# Patient Record
Sex: FEMALE | Race: WHITE | NOT HISPANIC OR LATINO | Employment: UNEMPLOYED
[De-identification: names, ages, dates, MRNs, and addresses within clinical notes are randomized per-mention and may not be internally consistent; named-entity substitution may affect disease eponyms.]

---

## 2017-07-17 PROCEDURE — 88175 CYTOPATH C/V AUTO FLUID REDO: CPT | Performed by: OBSTETRICS & GYNECOLOGY

## 2017-07-17 PROCEDURE — 87624 HPV HI-RISK TYP POOLED RSLT: CPT | Performed by: OBSTETRICS & GYNECOLOGY

## 2018-02-20 PROBLEM — Z28.21 REFUSED INFLUENZA VACCINE: Status: ACTIVE | Noted: 2018-02-20

## 2018-02-20 PROBLEM — Z13.1 SCREENING FOR DIABETES MELLITUS: Status: ACTIVE | Noted: 2018-02-20

## 2018-02-20 PROBLEM — Z13.220 SCREENING CHOLESTEROL LEVEL: Status: ACTIVE | Noted: 2018-02-20

## 2018-02-20 PROBLEM — R11.0 NAUSEA: Status: ACTIVE | Noted: 2018-02-20

## 2018-02-20 PROBLEM — Z12.12 SCREENING FOR COLORECTAL CANCER: Status: ACTIVE | Noted: 2018-02-20

## 2018-02-20 PROBLEM — G43.009 MIGRAINE WITHOUT AURA AND WITHOUT STATUS MIGRAINOSUS, NOT INTRACTABLE: Status: ACTIVE | Noted: 2018-02-20

## 2018-02-20 PROBLEM — Z12.11 SCREENING FOR COLORECTAL CANCER: Status: ACTIVE | Noted: 2018-02-20

## 2018-02-20 PROBLEM — Z00.00 PREVENTATIVE HEALTH CARE: Status: ACTIVE | Noted: 2018-02-20

## 2018-02-20 PROCEDURE — 81003 URINALYSIS AUTO W/O SCOPE: CPT | Performed by: INTERNAL MEDICINE

## 2018-09-04 PROBLEM — Z12.11 SCREENING FOR COLORECTAL CANCER: Status: RESOLVED | Noted: 2018-02-20 | Resolved: 2018-09-04

## 2018-09-04 PROBLEM — F45.8 GRINDING TEETH: Status: ACTIVE | Noted: 2018-09-04

## 2018-09-04 PROBLEM — J30.89 SEASONAL ALLERGIC RHINITIS DUE TO OTHER ALLERGIC TRIGGER: Status: ACTIVE | Noted: 2018-09-04

## 2018-09-04 PROBLEM — Z12.12 SCREENING FOR COLORECTAL CANCER: Status: RESOLVED | Noted: 2018-02-20 | Resolved: 2018-09-04

## 2018-09-04 PROBLEM — Z13.1 SCREENING FOR DIABETES MELLITUS: Status: RESOLVED | Noted: 2018-02-20 | Resolved: 2018-09-04

## 2018-09-04 PROBLEM — R06.7 SNEEZING: Status: ACTIVE | Noted: 2018-09-04

## 2018-09-04 PROBLEM — Z13.220 SCREENING CHOLESTEROL LEVEL: Status: RESOLVED | Noted: 2018-02-20 | Resolved: 2018-09-04

## 2018-09-04 PROBLEM — Z28.21 REFUSED INFLUENZA VACCINE: Status: RESOLVED | Noted: 2018-02-20 | Resolved: 2018-09-04

## 2018-09-04 PROBLEM — R09.81 NASAL CONGESTION: Status: ACTIVE | Noted: 2018-09-04

## 2018-09-04 PROBLEM — G43.109 MIGRAINE WITH AURA AND WITHOUT STATUS MIGRAINOSUS, NOT INTRACTABLE: Status: ACTIVE | Noted: 2018-09-04

## 2018-09-04 PROBLEM — G43.009 MIGRAINE WITHOUT AURA AND WITHOUT STATUS MIGRAINOSUS, NOT INTRACTABLE: Status: RESOLVED | Noted: 2018-02-20 | Resolved: 2018-09-04

## 2018-09-04 PROBLEM — G43.009 MIGRAINE WITHOUT AURA AND WITHOUT STATUS MIGRAINOSUS, NOT INTRACTABLE: Status: ACTIVE | Noted: 2018-09-04

## 2018-10-18 PROBLEM — Z28.21 REFUSED INFLUENZA VACCINE: Status: ACTIVE | Noted: 2018-10-18

## 2018-10-18 PROBLEM — G44.209 TENSION HEADACHE: Status: ACTIVE | Noted: 2018-10-18

## 2018-10-18 PROBLEM — E66.3 OVERWEIGHT (BMI 25.0-29.9): Status: ACTIVE | Noted: 2018-10-18

## 2018-10-18 PROBLEM — Z13.29 SCREENING FOR THYROID DISORDER: Status: ACTIVE | Noted: 2018-10-18

## 2018-10-22 PROBLEM — Z12.12 SCREENING FOR COLORECTAL CANCER: Status: ACTIVE | Noted: 2018-10-22

## 2018-10-22 PROBLEM — Z12.11 SCREENING FOR COLORECTAL CANCER: Status: ACTIVE | Noted: 2018-10-22

## 2018-10-22 PROBLEM — Z12.11 SCREENING FOR COLON CANCER: Status: ACTIVE | Noted: 2018-10-22

## 2018-11-01 PROBLEM — R11.0 NAUSEA: Status: RESOLVED | Noted: 2018-02-20 | Resolved: 2018-11-01

## 2018-11-01 PROBLEM — Z00.00 PREVENTATIVE HEALTH CARE: Status: RESOLVED | Noted: 2018-02-20 | Resolved: 2018-11-01

## 2018-11-01 PROBLEM — R06.7 SNEEZING: Status: RESOLVED | Noted: 2018-09-04 | Resolved: 2018-11-01

## 2018-11-01 PROBLEM — R09.81 NASAL CONGESTION: Status: RESOLVED | Noted: 2018-09-04 | Resolved: 2018-11-01

## 2018-11-21 PROBLEM — Z12.12 SCREENING FOR COLORECTAL CANCER: Status: RESOLVED | Noted: 2018-10-22 | Resolved: 2018-11-21

## 2018-11-21 PROBLEM — M54.50 CHRONIC BILATERAL LOW BACK PAIN WITHOUT SCIATICA: Status: ACTIVE | Noted: 2018-11-21

## 2018-11-21 PROBLEM — G43.009 MIGRAINE WITHOUT AURA AND WITHOUT STATUS MIGRAINOSUS, NOT INTRACTABLE: Status: RESOLVED | Noted: 2018-09-04 | Resolved: 2018-11-21

## 2018-11-21 PROBLEM — Z13.29 SCREENING FOR THYROID DISORDER: Status: RESOLVED | Noted: 2018-10-18 | Resolved: 2018-11-21

## 2018-11-21 PROBLEM — G44.209 TENSION HEADACHE: Status: RESOLVED | Noted: 2018-10-18 | Resolved: 2018-11-21

## 2018-11-21 PROBLEM — G89.29 CHRONIC BILATERAL LOW BACK PAIN WITHOUT SCIATICA: Status: ACTIVE | Noted: 2018-11-21

## 2018-11-21 PROBLEM — M62.830 BACK MUSCLE SPASM: Status: ACTIVE | Noted: 2018-11-21

## 2018-11-21 PROBLEM — G43.109 MIGRAINE WITH AURA AND WITHOUT STATUS MIGRAINOSUS, NOT INTRACTABLE: Status: RESOLVED | Noted: 2018-09-04 | Resolved: 2018-11-21

## 2018-11-21 PROBLEM — Z98.1 S/P LUMBAR SPINAL FUSION: Status: ACTIVE | Noted: 2018-11-21

## 2018-11-21 PROBLEM — Z28.21 REFUSED INFLUENZA VACCINE: Status: RESOLVED | Noted: 2018-10-18 | Resolved: 2018-11-21

## 2018-11-21 PROBLEM — Z12.11 SCREENING FOR COLORECTAL CANCER: Status: RESOLVED | Noted: 2018-10-22 | Resolved: 2018-11-21

## 2018-11-21 PROBLEM — Z12.11 SCREENING FOR COLON CANCER: Status: RESOLVED | Noted: 2018-10-22 | Resolved: 2018-11-21

## 2019-01-11 PROBLEM — R30.0 DYSURIA: Status: ACTIVE | Noted: 2019-01-11

## 2019-01-11 PROBLEM — R39.15 URINARY URGENCY: Status: ACTIVE | Noted: 2019-01-11

## 2019-01-11 PROBLEM — N39.0 ACUTE UTI: Status: ACTIVE | Noted: 2019-01-11

## 2019-02-11 PROBLEM — G43.109 MIGRAINE WITH AURA AND WITHOUT STATUS MIGRAINOSUS, NOT INTRACTABLE: Status: ACTIVE | Noted: 2019-02-11

## 2019-02-11 PROBLEM — R39.15 URINARY URGENCY: Status: RESOLVED | Noted: 2019-01-11 | Resolved: 2019-02-11

## 2019-02-11 PROBLEM — R30.0 DYSURIA: Status: RESOLVED | Noted: 2019-01-11 | Resolved: 2019-02-11

## 2019-02-11 PROBLEM — L71.9 ACNE ROSACEA: Status: ACTIVE | Noted: 2019-02-11

## 2019-02-11 PROBLEM — N39.0 ACUTE UTI: Status: RESOLVED | Noted: 2019-01-11 | Resolved: 2019-02-11

## 2019-09-03 PROBLEM — Z12.12 SCREENING FOR COLORECTAL CANCER: Status: ACTIVE | Noted: 2019-09-03

## 2019-09-03 PROBLEM — R10.13 DYSPEPSIA: Status: ACTIVE | Noted: 2019-09-03

## 2019-09-03 PROBLEM — G89.29 CHRONIC PAIN OF RIGHT KNEE: Status: ACTIVE | Noted: 2019-09-03

## 2019-09-03 PROBLEM — Z12.11 SCREENING FOR COLORECTAL CANCER: Status: ACTIVE | Noted: 2019-09-03

## 2019-09-03 PROBLEM — M25.561 CHRONIC PAIN OF RIGHT KNEE: Status: ACTIVE | Noted: 2019-09-03

## 2019-09-03 PROBLEM — K21.9 GASTROESOPHAGEAL REFLUX DISEASE WITHOUT ESOPHAGITIS: Status: ACTIVE | Noted: 2019-09-03

## 2019-09-03 PROBLEM — M17.11 PRIMARY OSTEOARTHRITIS OF RIGHT KNEE: Status: ACTIVE | Noted: 2019-09-03

## 2019-10-07 PROBLEM — F41.9 ANXIETY: Status: ACTIVE | Noted: 2019-10-07

## 2019-10-07 PROBLEM — Z01.818 PRE-OPERATIVE EXAM: Status: ACTIVE | Noted: 2019-10-07

## 2019-10-07 PROBLEM — Z13.228 SCREENING FOR METABOLIC DISORDER: Status: ACTIVE | Noted: 2019-10-07

## 2019-10-07 PROBLEM — Z13.6 SCREENING FOR CARDIOVASCULAR CONDITION: Status: ACTIVE | Noted: 2019-10-07

## 2019-10-22 LAB — COLONOSCOPY STUDY: NORMAL

## 2019-12-30 PROBLEM — Z12.31 VISIT FOR SCREENING MAMMOGRAM: Status: ACTIVE | Noted: 2019-12-30

## 2019-12-30 PROBLEM — R45.86 EMOTIONAL LABILITY: Status: ACTIVE | Noted: 2019-12-30

## 2019-12-30 PROBLEM — Z13.1 SCREENING FOR DIABETES MELLITUS: Status: ACTIVE | Noted: 2019-12-30

## 2019-12-30 PROBLEM — Z13.220 SCREENING CHOLESTEROL LEVEL: Status: ACTIVE | Noted: 2019-12-30

## 2019-12-30 PROBLEM — Z00.00 ANNUAL PHYSICAL EXAM: Status: ACTIVE | Noted: 2019-12-30

## 2019-12-30 PROBLEM — Z00.00 PREVENTATIVE HEALTH CARE: Status: ACTIVE | Noted: 2019-12-30

## 2020-01-01 PROBLEM — Z96.651 S/P TKR (TOTAL KNEE REPLACEMENT), RIGHT: Status: ACTIVE | Noted: 2020-01-01

## 2020-01-14 ENCOUNTER — TELEPHONE (OUTPATIENT)
Dept: SCHEDULING | Age: 55
End: 2020-01-14

## 2020-01-21 ENCOUNTER — OFFICE VISIT (OUTPATIENT)
Dept: FAMILY MEDICINE | Age: 55
End: 2020-01-21

## 2020-01-21 VITALS
DIASTOLIC BLOOD PRESSURE: 84 MMHG | HEART RATE: 108 BPM | TEMPERATURE: 97.8 F | SYSTOLIC BLOOD PRESSURE: 137 MMHG | BODY MASS INDEX: 29.02 KG/M2 | RESPIRATION RATE: 18 BRPM | WEIGHT: 174.16 LBS | HEIGHT: 65 IN

## 2020-01-21 DIAGNOSIS — M54.50 CHRONIC BILATERAL LOW BACK PAIN WITHOUT SCIATICA: ICD-10-CM

## 2020-01-21 DIAGNOSIS — Z00.00 PHYSICAL EXAM, ROUTINE: ICD-10-CM

## 2020-01-21 DIAGNOSIS — G89.29 CHRONIC BILATERAL LOW BACK PAIN WITHOUT SCIATICA: ICD-10-CM

## 2020-01-21 DIAGNOSIS — M17.11 PRIMARY OSTEOARTHRITIS OF RIGHT KNEE: ICD-10-CM

## 2020-01-21 DIAGNOSIS — J30.9 ALLERGIC RHINITIS, UNSPECIFIED SEASONALITY, UNSPECIFIED TRIGGER: ICD-10-CM

## 2020-01-21 DIAGNOSIS — F41.9 ANXIETY: ICD-10-CM

## 2020-01-21 DIAGNOSIS — K21.9 GASTROESOPHAGEAL REFLUX DISEASE WITHOUT ESOPHAGITIS: ICD-10-CM

## 2020-01-21 DIAGNOSIS — M96.1 FAILED BACK SYNDROME: Primary | ICD-10-CM

## 2020-01-21 DIAGNOSIS — G43.109 MIGRAINE WITH AURA AND WITHOUT STATUS MIGRAINOSUS, NOT INTRACTABLE: ICD-10-CM

## 2020-01-21 PROBLEM — Z98.1 S/P LUMBAR SPINAL FUSION: Status: ACTIVE | Noted: 2018-11-21

## 2020-01-21 PROBLEM — Z96.651 S/P TKR (TOTAL KNEE REPLACEMENT), RIGHT: Status: ACTIVE | Noted: 2020-01-01

## 2020-01-21 PROBLEM — L71.9 ACNE ROSACEA: Status: ACTIVE | Noted: 2019-02-11

## 2020-01-21 PROCEDURE — 99204 OFFICE O/P NEW MOD 45 MIN: CPT | Performed by: FAMILY MEDICINE

## 2020-01-21 RX ORDER — GABAPENTIN 800 MG/1
800 TABLET ORAL 3 TIMES DAILY
COMMUNITY
Start: 2019-12-30

## 2020-01-21 RX ORDER — PANTOPRAZOLE SODIUM 40 MG/1
40 TABLET, DELAYED RELEASE ORAL DAILY PRN
COMMUNITY
Start: 2016-02-04

## 2020-01-21 RX ORDER — SUMATRIPTAN 50 MG/1
50 TABLET, FILM COATED ORAL DAILY PRN
COMMUNITY
Start: 2019-09-03

## 2020-01-21 RX ORDER — ALPRAZOLAM 0.5 MG/1
0.5 TABLET ORAL 2 TIMES DAILY PRN
COMMUNITY
Start: 2019-12-30

## 2020-01-21 RX ORDER — TOPIRAMATE 50 MG/1
50 TABLET, FILM COATED ORAL NIGHTLY
COMMUNITY
Start: 2020-01-10

## 2020-01-21 RX ORDER — PROCHLORPERAZINE MALEATE 10 MG
10 TABLET ORAL EVERY 6 HOURS PRN
COMMUNITY
Start: 2016-01-25

## 2020-01-21 RX ORDER — TRAMADOL HYDROCHLORIDE 50 MG/1
50 TABLET ORAL DAILY PRN
COMMUNITY
Start: 2019-12-30

## 2020-01-21 RX ORDER — CYCLOBENZAPRINE HCL 10 MG
10 TABLET ORAL
COMMUNITY
Start: 2019-12-30

## 2020-01-21 RX ORDER — ESTRADIOL 0.05 MG/D
1 PATCH TRANSDERMAL
COMMUNITY
Start: 2016-02-06

## 2020-01-21 RX ORDER — CHOLECALCIFEROL (VITAMIN D3) 25 MCG
1000 TABLET,CHEWABLE ORAL DAILY
COMMUNITY

## 2020-01-21 RX ORDER — MONTELUKAST SODIUM 10 MG/1
10 TABLET ORAL DAILY
COMMUNITY
Start: 2019-09-03

## 2020-01-21 SDOH — HEALTH STABILITY: MENTAL HEALTH: HOW OFTEN DO YOU HAVE A DRINK CONTAINING ALCOHOL?: NEVER

## 2020-01-21 ASSESSMENT — PATIENT HEALTH QUESTIONNAIRE - PHQ9
1. LITTLE INTEREST OR PLEASURE IN DOING THINGS: NOT AT ALL
SUM OF ALL RESPONSES TO PHQ9 QUESTIONS 1 AND 2: 0
2. FEELING DOWN, DEPRESSED OR HOPELESS: NOT AT ALL
SUM OF ALL RESPONSES TO PHQ9 QUESTIONS 1 AND 2: 0

## 2020-01-21 ASSESSMENT — ENCOUNTER SYMPTOMS
FATIGUE: 0
CONFUSION: 0
NERVOUS/ANXIOUS: 0
NAUSEA: 0
RHINORRHEA: 0
SLEEP DISTURBANCE: 0
FEVER: 0
BLOOD IN STOOL: 0
CHEST TIGHTNESS: 0
DIARRHEA: 0
HALLUCINATIONS: 0
APNEA: 0
ABDOMINAL PAIN: 0
ENDOCRINE NEGATIVE: 1
PERIANAL NUMBNESS: 0
WHEEZING: 0
SORE THROAT: 0
CHOKING: 0
COUGH: 0
CONSTIPATION: 0
ANAL BLEEDING: 0
UNEXPECTED WEIGHT CHANGE: 0
CHILLS: 0
SINUS PRESSURE: 0
NEUROLOGICAL NEGATIVE: 1
EYES NEGATIVE: 1
BACK PAIN: 1
ABDOMINAL DISTENTION: 0
BOWEL INCONTINENCE: 0
HEMATOLOGIC/LYMPHATIC NEGATIVE: 1
SHORTNESS OF BREATH: 0

## 2020-05-08 PROBLEM — G43.009 MIGRAINE WITHOUT AURA AND WITHOUT STATUS MIGRAINOSUS, NOT INTRACTABLE: Status: ACTIVE | Noted: 2020-05-08

## 2020-12-15 PROBLEM — R05.9 COUGH: Status: ACTIVE | Noted: 2020-12-15

## 2020-12-15 PROBLEM — R09.81 NASAL CONGESTION: Status: ACTIVE | Noted: 2020-12-15

## 2020-12-15 PROBLEM — J01.00 ACUTE NON-RECURRENT MAXILLARY SINUSITIS: Status: ACTIVE | Noted: 2020-12-15

## 2021-01-01 ENCOUNTER — EXTERNAL RECORD (OUTPATIENT)
Dept: HEALTH INFORMATION MANAGEMENT | Facility: OTHER | Age: 56
End: 2021-01-01

## 2021-04-21 PROBLEM — J34.2 NASAL SEPTAL DEVIATION: Status: ACTIVE | Noted: 2021-04-21

## 2021-04-21 PROBLEM — J32.0 CHRONIC MAXILLARY SINUSITIS: Status: ACTIVE | Noted: 2021-04-21

## 2021-07-02 PROBLEM — M97.8XXA: Status: ACTIVE | Noted: 2021-07-02

## 2021-07-02 PROBLEM — Z96.659: Status: ACTIVE | Noted: 2021-07-02

## 2021-07-26 PROBLEM — Z12.4 CERVICAL CANCER SCREENING: Status: ACTIVE | Noted: 2021-07-26

## 2021-07-26 PROBLEM — Z79.899 ENCOUNTER FOR MEDICATION MANAGEMENT: Status: ACTIVE | Noted: 2021-07-26

## 2021-07-26 PROBLEM — J01.00 ACUTE NON-RECURRENT MAXILLARY SINUSITIS: Status: RESOLVED | Noted: 2020-12-15 | Resolved: 2021-07-26

## 2021-07-26 PROBLEM — Z13.820 SCREENING FOR OSTEOPOROSIS: Status: ACTIVE | Noted: 2021-07-26

## 2021-07-26 PROBLEM — Z01.818 PRE-OPERATIVE EXAM: Status: RESOLVED | Noted: 2019-10-07 | Resolved: 2021-07-26

## 2021-07-26 PROBLEM — J32.0 CHRONIC MAXILLARY SINUSITIS: Status: RESOLVED | Noted: 2021-04-21 | Resolved: 2021-07-26

## 2021-08-10 PROBLEM — Z96.649 PERIPROSTHETIC FRACTURE OF HIP, SUBSEQUENT ENCOUNTER: Status: ACTIVE | Noted: 2021-07-02

## 2021-08-10 PROBLEM — M97.8XXD PERIPROSTHETIC FRACTURE OF HIP, SUBSEQUENT ENCOUNTER: Status: ACTIVE | Noted: 2021-07-02

## 2021-08-30 PROBLEM — M25.561 CHRONIC PAIN OF RIGHT KNEE: Status: RESOLVED | Noted: 2019-09-03 | Resolved: 2021-08-30

## 2021-08-30 PROBLEM — E66.3 OVERWEIGHT (BMI 25.0-29.9): Status: RESOLVED | Noted: 2018-10-18 | Resolved: 2021-08-30

## 2021-08-30 PROBLEM — Z00.00 PREVENTATIVE HEALTH CARE: Status: RESOLVED | Noted: 2019-12-30 | Resolved: 2021-08-30

## 2021-08-30 PROBLEM — Z13.228 SCREENING FOR METABOLIC DISORDER: Status: RESOLVED | Noted: 2019-10-07 | Resolved: 2021-08-30

## 2021-08-30 PROBLEM — Z12.11 SCREENING FOR COLORECTAL CANCER: Status: RESOLVED | Noted: 2019-09-03 | Resolved: 2021-08-30

## 2021-08-30 PROBLEM — M19.90 ARTHRITIS: Status: ACTIVE | Noted: 2017-07-19

## 2021-08-30 PROBLEM — Z12.12 SCREENING FOR COLORECTAL CANCER: Status: RESOLVED | Noted: 2019-09-03 | Resolved: 2021-08-30

## 2021-08-30 PROBLEM — G89.4 CHRONIC PAIN DISORDER: Status: ACTIVE | Noted: 2017-07-19

## 2021-08-30 PROBLEM — Z00.00 ANNUAL PHYSICAL EXAM: Status: RESOLVED | Noted: 2019-12-30 | Resolved: 2021-08-30

## 2021-08-30 PROBLEM — Z79.899 ENCOUNTER FOR MEDICATION MANAGEMENT: Status: RESOLVED | Noted: 2021-07-26 | Resolved: 2021-08-30

## 2021-08-30 PROBLEM — R05.9 COUGH: Status: RESOLVED | Noted: 2020-12-15 | Resolved: 2021-08-30

## 2021-08-30 PROBLEM — G62.9 NEUROPATHY: Status: ACTIVE | Noted: 2021-08-30

## 2021-08-30 PROBLEM — Z13.220 SCREENING CHOLESTEROL LEVEL: Status: RESOLVED | Noted: 2019-12-30 | Resolved: 2021-08-30

## 2021-08-30 PROBLEM — Z12.4 CERVICAL CANCER SCREENING: Status: RESOLVED | Noted: 2021-07-26 | Resolved: 2021-08-30

## 2021-08-30 PROBLEM — Z13.6 SCREENING FOR CARDIOVASCULAR CONDITION: Status: RESOLVED | Noted: 2019-10-07 | Resolved: 2021-08-30

## 2021-08-30 PROBLEM — Z12.31 VISIT FOR SCREENING MAMMOGRAM: Status: RESOLVED | Noted: 2019-12-30 | Resolved: 2021-08-30

## 2021-08-30 PROBLEM — Z13.1 SCREENING FOR DIABETES MELLITUS: Status: RESOLVED | Noted: 2019-12-30 | Resolved: 2021-08-30

## 2021-08-30 PROBLEM — G89.29 CHRONIC PAIN OF RIGHT KNEE: Status: RESOLVED | Noted: 2019-09-03 | Resolved: 2021-08-30

## 2021-08-30 PROBLEM — Z13.820 SCREENING FOR OSTEOPOROSIS: Status: RESOLVED | Noted: 2021-07-26 | Resolved: 2021-08-30

## 2021-09-15 PROBLEM — M97.8XXA PERIPROSTHETIC FRACTURE AROUND INTERNAL PROSTHETIC KNEE JOINT: Status: ACTIVE | Noted: 2021-07-02

## 2021-09-15 PROBLEM — Z96.659 PERIPROSTHETIC FRACTURE AROUND INTERNAL PROSTHETIC KNEE JOINT: Status: ACTIVE | Noted: 2021-07-02

## 2021-09-15 PROBLEM — Z98.890 S/P ORIF (OPEN REDUCTION INTERNAL FIXATION) FRACTURE: Status: ACTIVE | Noted: 2021-09-15

## 2021-09-15 PROBLEM — Z87.81 S/P ORIF (OPEN REDUCTION INTERNAL FIXATION) FRACTURE: Status: ACTIVE | Noted: 2021-09-15

## 2022-04-11 ENCOUNTER — TELEMEDICINE (OUTPATIENT)
Dept: TELEHEALTH | Age: 57
End: 2022-04-11

## 2022-04-11 DIAGNOSIS — Z02.9 ENCOUNTERS FOR ADMINISTRATIVE PURPOSE: Primary | ICD-10-CM

## 2022-04-11 NOTE — PROGRESS NOTES
Pt presents for med refill, needs alprazolam refilled. Discussed limitations of On demand video visit, unable to refill that medication. Advised to reach out to PCP for refill. Patient verbalized understanding.  No charge

## 2023-01-23 PROBLEM — G62.9 NEUROPATHY: Status: ACTIVE | Noted: 2023-01-23

## 2023-02-02 ENCOUNTER — OFFICE VISIT (OUTPATIENT)
Dept: FAMILY MEDICINE | Age: 58
End: 2023-02-02

## 2023-02-02 VITALS
DIASTOLIC BLOOD PRESSURE: 49 MMHG | BODY MASS INDEX: 24.39 KG/M2 | SYSTOLIC BLOOD PRESSURE: 131 MMHG | TEMPERATURE: 97.8 F | WEIGHT: 146.4 LBS | HEIGHT: 65 IN | HEART RATE: 81 BPM

## 2023-02-02 DIAGNOSIS — Z00.00 PHYSICAL EXAM, ROUTINE: ICD-10-CM

## 2023-02-02 DIAGNOSIS — G89.29 CHRONIC BILATERAL LOW BACK PAIN WITHOUT SCIATICA: Primary | ICD-10-CM

## 2023-02-02 DIAGNOSIS — M96.1 FAILED BACK SYNDROME: ICD-10-CM

## 2023-02-02 DIAGNOSIS — M54.50 CHRONIC BILATERAL LOW BACK PAIN WITHOUT SCIATICA: Primary | ICD-10-CM

## 2023-02-02 PROCEDURE — 99202 OFFICE O/P NEW SF 15 MIN: CPT | Performed by: FAMILY MEDICINE

## 2023-02-02 RX ORDER — BUTALBITAL, ACETAMINOPHEN AND CAFFEINE 300; 40; 50 MG/1; MG/1; MG/1
CAPSULE ORAL
COMMUNITY
Start: 2022-12-14

## 2023-02-02 RX ORDER — CYCLOBENZAPRINE HCL 10 MG
10 TABLET ORAL
COMMUNITY
End: 2023-02-02 | Stop reason: SDUPTHER

## 2023-02-02 RX ORDER — ALPRAZOLAM 0.5 MG/1
0.5 TABLET ORAL
COMMUNITY
Start: 2023-01-24 | End: 2023-02-02 | Stop reason: SDUPTHER

## 2023-02-02 RX ORDER — PROGESTERONE 100 MG/1
100 CAPSULE ORAL
COMMUNITY
End: 2023-02-02 | Stop reason: SDUPTHER

## 2023-02-02 RX ORDER — TOPIRAMATE 50 MG/1
50 TABLET, FILM COATED ORAL
COMMUNITY
End: 2023-02-02 | Stop reason: SDUPTHER

## 2023-02-02 ASSESSMENT — ENCOUNTER SYMPTOMS
GASTROINTESTINAL NEGATIVE: 1
NEUROLOGICAL NEGATIVE: 1
CONSTITUTIONAL NEGATIVE: 1
BACK PAIN: 1
RESPIRATORY NEGATIVE: 1

## 2023-02-02 ASSESSMENT — PATIENT HEALTH QUESTIONNAIRE - PHQ9
1. LITTLE INTEREST OR PLEASURE IN DOING THINGS: NOT AT ALL
SUM OF ALL RESPONSES TO PHQ9 QUESTIONS 1 AND 2: 0
SUM OF ALL RESPONSES TO PHQ9 QUESTIONS 1 AND 2: 0
CLINICAL INTERPRETATION OF PHQ2 SCORE: NO FURTHER SCREENING NEEDED
2. FEELING DOWN, DEPRESSED OR HOPELESS: NOT AT ALL

## 2023-02-15 ENCOUNTER — E-ADVICE (OUTPATIENT)
Dept: FAMILY MEDICINE | Age: 58
End: 2023-02-15

## 2023-02-17 LAB — HM MAMMOGRAPHY BILATERAL: NORMAL
